# Patient Record
Sex: MALE | Race: WHITE | NOT HISPANIC OR LATINO | Employment: FULL TIME | URBAN - METROPOLITAN AREA
[De-identification: names, ages, dates, MRNs, and addresses within clinical notes are randomized per-mention and may not be internally consistent; named-entity substitution may affect disease eponyms.]

---

## 2020-09-09 PROCEDURE — 99284 EMERGENCY DEPT VISIT MOD MDM: CPT

## 2020-09-10 ENCOUNTER — HOSPITAL ENCOUNTER (EMERGENCY)
Facility: MEDICAL CENTER | Age: 35
End: 2020-09-10
Attending: EMERGENCY MEDICINE
Payer: COMMERCIAL

## 2020-09-10 ENCOUNTER — APPOINTMENT (OUTPATIENT)
Dept: RADIOLOGY | Facility: MEDICAL CENTER | Age: 35
End: 2020-09-10
Attending: EMERGENCY MEDICINE
Payer: COMMERCIAL

## 2020-09-10 VITALS
RESPIRATION RATE: 17 BRPM | SYSTOLIC BLOOD PRESSURE: 147 MMHG | HEIGHT: 67 IN | DIASTOLIC BLOOD PRESSURE: 90 MMHG | HEART RATE: 80 BPM | WEIGHT: 127.21 LBS | TEMPERATURE: 97.7 F | OXYGEN SATURATION: 99 % | BODY MASS INDEX: 19.97 KG/M2

## 2020-09-10 DIAGNOSIS — F41.9 ANXIETY: ICD-10-CM

## 2020-09-10 DIAGNOSIS — R07.89 CHEST WALL PAIN: ICD-10-CM

## 2020-09-10 LAB
ALBUMIN SERPL BCP-MCNC: 4.6 G/DL (ref 3.2–4.9)
ALBUMIN/GLOB SERPL: 1.6 G/DL
ALP SERPL-CCNC: 121 U/L (ref 30–99)
ALT SERPL-CCNC: 119 U/L (ref 2–50)
ANION GAP SERPL CALC-SCNC: 18 MMOL/L (ref 7–16)
AST SERPL-CCNC: 138 U/L (ref 12–45)
BASOPHILS # BLD AUTO: 0.6 % (ref 0–1.8)
BASOPHILS # BLD: 0.04 K/UL (ref 0–0.12)
BILIRUB SERPL-MCNC: 1.2 MG/DL (ref 0.1–1.5)
BUN SERPL-MCNC: 8 MG/DL (ref 8–22)
CALCIUM SERPL-MCNC: 10.1 MG/DL (ref 8.5–10.5)
CHLORIDE SERPL-SCNC: 96 MMOL/L (ref 96–112)
CO2 SERPL-SCNC: 24 MMOL/L (ref 20–33)
CREAT SERPL-MCNC: 0.6 MG/DL (ref 0.5–1.4)
EOSINOPHIL # BLD AUTO: 0.05 K/UL (ref 0–0.51)
EOSINOPHIL NFR BLD: 0.8 % (ref 0–6.9)
ERYTHROCYTE [DISTWIDTH] IN BLOOD BY AUTOMATED COUNT: 42.8 FL (ref 35.9–50)
GLOBULIN SER CALC-MCNC: 2.9 G/DL (ref 1.9–3.5)
GLUCOSE SERPL-MCNC: 93 MG/DL (ref 65–99)
HCT VFR BLD AUTO: 48.5 % (ref 42–52)
HGB BLD-MCNC: 17.3 G/DL (ref 14–18)
IMM GRANULOCYTES # BLD AUTO: 0.01 K/UL (ref 0–0.11)
IMM GRANULOCYTES NFR BLD AUTO: 0.2 % (ref 0–0.9)
LYMPHOCYTES # BLD AUTO: 1.66 K/UL (ref 1–4.8)
LYMPHOCYTES NFR BLD: 26.4 % (ref 22–41)
MCH RBC QN AUTO: 34.8 PG (ref 27–33)
MCHC RBC AUTO-ENTMCNC: 35.7 G/DL (ref 33.7–35.3)
MCV RBC AUTO: 97.6 FL (ref 81.4–97.8)
MONOCYTES # BLD AUTO: 0.57 K/UL (ref 0–0.85)
MONOCYTES NFR BLD AUTO: 9.1 % (ref 0–13.4)
NEUTROPHILS # BLD AUTO: 3.95 K/UL (ref 1.82–7.42)
NEUTROPHILS NFR BLD: 62.9 % (ref 44–72)
NRBC # BLD AUTO: 0 K/UL
NRBC BLD-RTO: 0 /100 WBC
PLATELET # BLD AUTO: 301 K/UL (ref 164–446)
PMV BLD AUTO: 10.1 FL (ref 9–12.9)
POTASSIUM SERPL-SCNC: 3.2 MMOL/L (ref 3.6–5.5)
PROT SERPL-MCNC: 7.5 G/DL (ref 6–8.2)
RBC # BLD AUTO: 4.97 M/UL (ref 4.7–6.1)
SODIUM SERPL-SCNC: 138 MMOL/L (ref 135–145)
TROPONIN T SERPL-MCNC: <6 NG/L (ref 6–19)
WBC # BLD AUTO: 6.3 K/UL (ref 4.8–10.8)

## 2020-09-10 PROCEDURE — 84484 ASSAY OF TROPONIN QUANT: CPT

## 2020-09-10 PROCEDURE — 93005 ELECTROCARDIOGRAM TRACING: CPT | Performed by: EMERGENCY MEDICINE

## 2020-09-10 PROCEDURE — 71045 X-RAY EXAM CHEST 1 VIEW: CPT

## 2020-09-10 PROCEDURE — 99283 EMERGENCY DEPT VISIT LOW MDM: CPT

## 2020-09-10 PROCEDURE — 85025 COMPLETE CBC W/AUTO DIFF WBC: CPT

## 2020-09-10 PROCEDURE — 80053 COMPREHEN METABOLIC PANEL: CPT

## 2020-09-10 PROCEDURE — 36415 COLL VENOUS BLD VENIPUNCTURE: CPT

## 2020-09-10 RX ORDER — IBUPROFEN 800 MG/1
800 TABLET ORAL EVERY 8 HOURS PRN
Qty: 30 TAB | Refills: 0 | Status: SHIPPED | OUTPATIENT
Start: 2020-09-10

## 2020-09-10 SDOH — HEALTH STABILITY: MENTAL HEALTH: HOW MANY STANDARD DRINKS CONTAINING ALCOHOL DO YOU HAVE ON A TYPICAL DAY?: 5 OR 6

## 2020-09-10 SDOH — HEALTH STABILITY: MENTAL HEALTH: HOW OFTEN DO YOU HAVE 6 OR MORE DRINKS ON ONE OCCASION?: DAILY OR ALMOST DAILY

## 2020-09-10 SDOH — HEALTH STABILITY: MENTAL HEALTH: HOW OFTEN DO YOU HAVE A DRINK CONTAINING ALCOHOL?: 4 OR MORE TIMES A WEEK

## 2020-09-10 NOTE — ED TRIAGE NOTES
"Curt Doyle   34 y.o. male   Chief Complaint   Patient presents with   • Chest Pain     off and on for a few days, history of panic attacks but they are not usually linked to chest pain. substernal that does not radiate, reports cold clammy skin and feeling flushed as well. heavy ETOH per pt. one beer today      Pt amb to triage with steady gait for above complaint.      Pt is alert and oriented, speaking in full sentences, follows commands and responds appropriately to questions. NAD. Resp are even and unlabored.   Pt placed in lobby. Pt educated on triage process. Pt encouraged to alert staff for any changes.    /88   Pulse 95   Temp 36.5 °C (97.7 °F) (Temporal)   Resp 19   Ht 1.702 m (5' 7\")   Wt 57.7 kg (127 lb 3.3 oz)   SpO2 98%   BMI 19.92 kg/m²     "

## 2020-09-10 NOTE — ED NOTES
Discharge paperwork reviewed and signed with patient. No questions at this time. PIV removed by RN. Patient ambulated out of ED with a steady gait.

## 2020-09-10 NOTE — ED PROVIDER NOTES
ED Provider Note    CHIEF COMPLAINT  Chief Complaint   Patient presents with   • Chest Pain     off and on for a few days, history of panic attacks but they are not usually linked to chest pain. substernal that does not radiate, reports cold clammy skin and feeling flushed as well. heavy ETOH per pt. one beer today       HPI  Curt Kwon is a 34 y.o. male here for evaluation of chest pain.  Patient states he has had intermittent substernal chest pain for approximately 4 to 5 days, and he states that it is staying in the anterior chest.  He has no radiation of pain to the back, or neck.  However he states that over the last day he has had some bilateral upper extremity tingling, and fast breathing, but does not feel short of breath.  He has no nausea or vomiting.  Nothing seems alleviate or exacerbate his symptoms.  He did take 2 Deann aspirin prior to coming in without any relief of his pain.  He has no abdominal pain, headache, or leg pain.      ROS  See HPI for further details, o/w negative.     PAST MEDICAL HISTORY       SOCIAL HISTORY  Social History     Tobacco Use   • Smoking status: Never Smoker   • Smokeless tobacco: Current User     Types: Chew   Substance and Sexual Activity   • Alcohol use: Yes     Frequency: 4 or more times a week     Drinks per session: 5 or 6     Binge frequency: Daily or almost daily   • Drug use: Never   • Sexual activity: Not on file       Family History  No bleeding disorders    SURGICAL HISTORY  patient denies any surgical history    CURRENT MEDICATIONS  Home Medications     Reviewed by Roger Vasquez R.N. (Registered Nurse) on 09/10/20 at 0026  Med List Status: <None>   Medication Last Dose Status        Patient Marito Taking any Medications                       ALLERGIES  No Known Allergies    REVIEW OF SYSTEMS  See HPI for further details. Review of systems as above, otherwise all other systems are negative.     PHYSICAL EXAM  Constitutional: Well developed, well  nourished.  Mild acute distress.  HEENT: Normocephalic, atraumatic. Posterior pharynx clear and moist.  Eyes:  EOMI. Normal sclera.  Neck: Supple, Full range of motion, nontender.  Chest/Pulmonary: clear to ausculation. Symmetrical expansion.   Cardio: Regular rate and rhythm with no murmur.   Abdomen: Soft, nontender. No peritoneal signs. No guarding. No palpable masses.  Musculoskeletal: No deformity, no edema, neurovascular intact.   Neuro: Clear speech, appropriate, cooperative, cranial nerves II-XII grossly intact.  Psych: Anxious mood and affect      Results for orders placed or performed during the hospital encounter of 09/10/20   CBC with Differential   Result Value Ref Range    WBC 6.3 4.8 - 10.8 K/uL    RBC 4.97 4.70 - 6.10 M/uL    Hemoglobin 17.3 14.0 - 18.0 g/dL    Hematocrit 48.5 42.0 - 52.0 %    MCV 97.6 81.4 - 97.8 fL    MCH 34.8 (H) 27.0 - 33.0 pg    MCHC 35.7 (H) 33.7 - 35.3 g/dL    RDW 42.8 35.9 - 50.0 fL    Platelet Count 301 164 - 446 K/uL    MPV 10.1 9.0 - 12.9 fL    Neutrophils-Polys 62.90 44.00 - 72.00 %    Lymphocytes 26.40 22.00 - 41.00 %    Monocytes 9.10 0.00 - 13.40 %    Eosinophils 0.80 0.00 - 6.90 %    Basophils 0.60 0.00 - 1.80 %    Immature Granulocytes 0.20 0.00 - 0.90 %    Nucleated RBC 0.00 /100 WBC    Neutrophils (Absolute) 3.95 1.82 - 7.42 K/uL    Lymphs (Absolute) 1.66 1.00 - 4.80 K/uL    Monos (Absolute) 0.57 0.00 - 0.85 K/uL    Eos (Absolute) 0.05 0.00 - 0.51 K/uL    Baso (Absolute) 0.04 0.00 - 0.12 K/uL    Immature Granulocytes (abs) 0.01 0.00 - 0.11 K/uL    NRBC (Absolute) 0.00 K/uL   Complete Metabolic Panel (CMP)   Result Value Ref Range    Sodium 138 135 - 145 mmol/L    Potassium 3.2 (L) 3.6 - 5.5 mmol/L    Chloride 96 96 - 112 mmol/L    Co2 24 20 - 33 mmol/L    Anion Gap 18.0 (H) 7.0 - 16.0    Glucose 93 65 - 99 mg/dL    Bun 8 8 - 22 mg/dL    Creatinine 0.60 0.50 - 1.40 mg/dL    Calcium 10.1 8.5 - 10.5 mg/dL    AST(SGOT) 138 (H) 12 - 45 U/L    ALT(SGPT) 119 (H) 2 - 50  "U/L    Alkaline Phosphatase 121 (H) 30 - 99 U/L    Total Bilirubin 1.2 0.1 - 1.5 mg/dL    Albumin 4.6 3.2 - 4.9 g/dL    Total Protein 7.5 6.0 - 8.2 g/dL    Globulin 2.9 1.9 - 3.5 g/dL    A-G Ratio 1.6 g/dL   Troponin   Result Value Ref Range    Troponin T <6 6 - 19 ng/L   ESTIMATED GFR   Result Value Ref Range    GFR If African American >60 >60 mL/min/1.73 m 2    GFR If Non African American >60 >60 mL/min/1.73 m 2     DX-CHEST-PORTABLE (1 VIEW)   Final Result         1.  No acute cardiopulmonary disease.        Ekg;  nsr 77, no ST elevation, no ST depression.  QTC is 444, no comparison    PROCEDURES     MEDICAL RECORD  I have reviewed patient's medical record and pertinent results are listed.    COURSE & MEDICAL DECISION MAKING  I have reviewed any medical record information, laboratory studies and radiographic results as noted above.    1:49 AM  She has a negative troponin, unremarkable EKG, negative chest x-ray.  He has had symptoms for around 4 days, and at this point if it were cardiac, he would have a positive troponin.  He also has history of panic and anxiety, and his chest pain associated with mild paresthesias of bilateral hands could be a result.  Patient is comfortable, afebrile, and well-appearing.  He has no shortness of breath, no recent travel, and heart rate around 80.  I am not suspecting PE at this time.  Patient states he drinks \"a lot of alcohol.\"  He drinks 3-8 beers a day.  His liver enzymes are elevated, and he will need to follow-up.  He agrees to do so in the next 2 to 3 days.    If you have had any blood pressure issues while here in the emergency department, please see your doctor for a further evaluation or work up.    Differential diagnoses include but not limited to: MI, PE, pneumonia, anxiety    This patient presents with pain and anxiety.  At this time, I have counseled the patient/family regarding their medications, pain control, and follow up.  They will continue their medications, " if any, as prescribed.  They will return immediately for any worsening symptoms and/or any other medical concerns.  They will see their doctor, or contact the doctor provided, in 1-2 days for follow up.       FINAL IMPRESSION  Chest pain  Anxiety  Elevated liver enzymes      Electronically signed by: Abdifatah Donato D.O., 9/10/2020 1:14 AM

## 2024-06-18 ENCOUNTER — HOSPITAL ENCOUNTER (OUTPATIENT)
Dept: CARDIOLOGY | Facility: MEDICAL CENTER | Age: 39
End: 2024-06-18
Payer: COMMERCIAL

## 2024-06-18 LAB — EKG IMPRESSION: NORMAL

## 2024-06-18 PROCEDURE — 93010 ELECTROCARDIOGRAM REPORT: CPT | Performed by: INTERNAL MEDICINE

## 2024-06-18 PROCEDURE — 93005 ELECTROCARDIOGRAM TRACING: CPT

## 2024-07-01 ENCOUNTER — APPOINTMENT (OUTPATIENT)
Dept: RADIOLOGY | Facility: IMAGING CENTER | Age: 39
End: 2024-07-01
Payer: COMMERCIAL

## 2024-07-01 ENCOUNTER — OFFICE VISIT (OUTPATIENT)
Dept: URGENT CARE | Facility: CLINIC | Age: 39
End: 2024-07-01
Payer: COMMERCIAL

## 2024-07-01 VITALS
WEIGHT: 142 LBS | BODY MASS INDEX: 22.82 KG/M2 | SYSTOLIC BLOOD PRESSURE: 130 MMHG | HEART RATE: 97 BPM | DIASTOLIC BLOOD PRESSURE: 84 MMHG | OXYGEN SATURATION: 94 % | HEIGHT: 66 IN | RESPIRATION RATE: 16 BRPM | TEMPERATURE: 97.6 F

## 2024-07-01 DIAGNOSIS — M79.672 LEFT FOOT PAIN: ICD-10-CM

## 2024-07-01 PROCEDURE — 73630 X-RAY EXAM OF FOOT: CPT | Mod: TC,LT

## 2024-07-01 PROCEDURE — 3075F SYST BP GE 130 - 139MM HG: CPT

## 2024-07-01 PROCEDURE — 3079F DIAST BP 80-89 MM HG: CPT

## 2024-07-01 PROCEDURE — 99203 OFFICE O/P NEW LOW 30 MIN: CPT

## 2024-07-01 RX ORDER — ALPRAZOLAM 0.5 MG/1
0.5 TABLET ORAL
COMMUNITY
Start: 2024-06-19

## 2024-07-01 RX ORDER — ATOMOXETINE 60 MG/1
60 CAPSULE ORAL
COMMUNITY
Start: 2024-06-11

## 2024-07-01 RX ORDER — ESCITALOPRAM OXALATE 20 MG/1
20 TABLET ORAL
COMMUNITY
Start: 2024-06-11

## 2024-07-22 ENCOUNTER — OFFICE VISIT (OUTPATIENT)
Dept: SPORTS MEDICINE | Facility: OTHER | Age: 39
End: 2024-07-22
Payer: COMMERCIAL

## 2024-07-22 VITALS
HEART RATE: 99 BPM | OXYGEN SATURATION: 95 % | WEIGHT: 145 LBS | DIASTOLIC BLOOD PRESSURE: 92 MMHG | BODY MASS INDEX: 23.3 KG/M2 | HEIGHT: 66 IN | TEMPERATURE: 97.6 F | SYSTOLIC BLOOD PRESSURE: 138 MMHG

## 2024-07-22 DIAGNOSIS — M79.672 CHRONIC FOOT PAIN, LEFT: ICD-10-CM

## 2024-07-22 DIAGNOSIS — G89.29 CHRONIC FOOT PAIN, LEFT: ICD-10-CM

## 2024-07-22 PROCEDURE — 99214 OFFICE O/P EST MOD 30 MIN: CPT | Performed by: FAMILY MEDICINE

## 2024-07-22 PROCEDURE — 3075F SYST BP GE 130 - 139MM HG: CPT | Performed by: FAMILY MEDICINE

## 2024-07-22 PROCEDURE — 3080F DIAST BP >= 90 MM HG: CPT | Performed by: FAMILY MEDICINE

## 2024-07-22 ASSESSMENT — ENCOUNTER SYMPTOMS
CHILLS: 0
SHORTNESS OF BREATH: 0
DIZZINESS: 0
FEVER: 0
NAUSEA: 0
VOMITING: 0

## 2024-08-12 ENCOUNTER — OFFICE VISIT (OUTPATIENT)
Dept: SPORTS MEDICINE | Facility: OTHER | Age: 39
End: 2024-08-12
Payer: COMMERCIAL

## 2024-08-12 VITALS
OXYGEN SATURATION: 97 % | HEIGHT: 66 IN | WEIGHT: 145 LBS | HEART RATE: 100 BPM | BODY MASS INDEX: 23.3 KG/M2 | SYSTOLIC BLOOD PRESSURE: 122 MMHG | RESPIRATION RATE: 16 BRPM | DIASTOLIC BLOOD PRESSURE: 76 MMHG | TEMPERATURE: 97.8 F

## 2024-08-12 DIAGNOSIS — M79.672 CHRONIC FOOT PAIN, LEFT: ICD-10-CM

## 2024-08-12 DIAGNOSIS — G89.29 CHRONIC FOOT PAIN, LEFT: ICD-10-CM

## 2024-08-12 PROCEDURE — 3078F DIAST BP <80 MM HG: CPT | Performed by: FAMILY MEDICINE

## 2024-08-12 PROCEDURE — 99213 OFFICE O/P EST LOW 20 MIN: CPT | Performed by: FAMILY MEDICINE

## 2024-08-12 PROCEDURE — 3074F SYST BP LT 130 MM HG: CPT | Performed by: FAMILY MEDICINE

## 2024-08-12 ASSESSMENT — ENCOUNTER SYMPTOMS
VOMITING: 0
CHILLS: 0
NAUSEA: 0
DIZZINESS: 0
SHORTNESS OF BREATH: 0
FEVER: 0

## 2024-08-12 NOTE — PROGRESS NOTES
Chief Complaint   Patient presents with    Foot Pain     L foot pain      Subjective     Referred by RADHA Booth  for evaluation of LEFT ankle pain  2+ months  Achy and sharp  Medial ankle  Worsening  Improved with rest and minimal improvement with ankle compression sleeve  Worse with excessive walking  Worse after work shift when he sits a while  Has tried Tylenol which does relieve some of his symptoms  Denies any night symptoms related to the LEFT ankle pain, but since he has been walking with an altered gait he has noticed pain in the RIGHT leg and some of that pain is shifted up to the RIGHT side of his trunk as well  He denies any remote injuries or issues with the LEFT lower extremity    Update:  LEFT foot pain PERSISTS    Works as a  at [employer], on his feet for several hours per shift  Activities include taking his kids to the park and mostly recovering/relaxing    Review of Systems   Constitutional:  Negative for chills and fever.   Respiratory:  Negative for shortness of breath.    Cardiovascular:  Negative for chest pain.   Gastrointestinal:  Negative for nausea and vomiting.   Neurological:  Negative for dizziness.     PMH:  has no past medical history on file.  MEDS:   Current Outpatient Medications:     ALPRAZolam (XANAX) 0.5 MG Tab, Take 0.5 mg by mouth every 24 hours as needed., Disp: , Rfl:     atomoxetine (STRATTERA) 60 MG capsule, Take 60 mg by mouth every day., Disp: , Rfl:     escitalopram (LEXAPRO) 20 MG tablet, Take 20 mg by mouth every day., Disp: , Rfl:     diclofenac sodium (VOLTAREN) 1 % Gel, Apply 1 g topically 4 times a day as needed (foot pain)., Disp: 150 g, Rfl: 0    ibuprofen (MOTRIN) 800 MG Tab, Take 1 Tab by mouth every 8 hours as needed., Disp: 30 Tab, Rfl: 0  ALLERGIES: No Known Allergies  SURGHX: No past surgical history on file.  SOCHX:  reports that he has never smoked. His smokeless tobacco use includes chew. He reports current alcohol use. He reports that he  "does not use drugs.  FH: Family history was reviewed, no pertinent findings to report    Objective   /76 (BP Location: Left arm, Patient Position: Sitting, BP Cuff Size: Adult)   Pulse 100   Temp 36.6 °C (97.8 °F) (Temporal)   Resp 16   Ht 1.664 m (5' 5.5\")   Wt 65.8 kg (145 lb)   SpO2 97%   BMI 23.76 kg/m²   \  LEFT ANKLE:  There is NO swelling noted at the ankle  Range of motion intact with dorsiflexion and plantarflexion, inversion and eversion  There is NO tenderness of the ATFL, CF or PTF ligament  There is NO tenderness of the lateral malleolus or medial malleolus  Anterior drawer testing is NEGATIVE  Talar tilt testing is NEGATIVE  The foot and ankle is otherwise neurovascularly intact    LEFT FOOT:  There is NO swelling noted at the foot  CONTINUED POSITIVE tenderness along the cuboid, and minimal to no tenderness along the tarsal navicular  There is NO pain with metatarsal squeeze test    NEUTRAL stance  Able to ambulate with Antalgic gait    1. Chronic foot pain, left  MR-FOOT-W/O LEFT    MR-FOOT-W/O LEFT        2+ months  Achy and sharp  Medial ankle  Worsening    X-rays were \"normal\", but I do feel there is some unexplained haziness in the region of the cuboid at the area of his tenderness  Question the possibility of tarsal coalition    He is NOT respond to home exercise program and immobilization   Check MRI of the foot for assessment of possible tarsal coalition versus stress fracture of the cuboid    Initial provider directed care for this issue was on July 1, 2024 at the urgent care  FAILED conservative care including immobilization, rest and home exercise program    No follow-ups on file.  3 wks or after MRI to discuss results and further management options        7/1/2024 3:41 PM     HISTORY/REASON FOR EXAM:  Atraumatic Pain/Swelling/Deformity; lateral foot        TECHNIQUE/EXAM DESCRIPTION AND NUMBER OF VIEWS:  3 views of the LEFT foot.     COMPARISON:  None.     FINDINGS:  No acute " fracture is noted. There is no dislocation.  No bone erosion is noted.     IMPRESSION:     No acute fracture or dislocation is noted.           Exam Ended: 07/01/24  3:54 PM Last Resulted: 07/01/24  4:01 PM        Thank you RADHA Booth for allowing me to participate in care of your patient.      ADDENDUM:   Temporary handicap placard form completed

## 2024-09-07 ENCOUNTER — HOSPITAL ENCOUNTER (OUTPATIENT)
Dept: RADIOLOGY | Facility: MEDICAL CENTER | Age: 39
End: 2024-09-07
Attending: FAMILY MEDICINE
Payer: COMMERCIAL

## 2024-09-07 DIAGNOSIS — G89.29 CHRONIC FOOT PAIN, LEFT: ICD-10-CM

## 2024-09-07 DIAGNOSIS — M79.672 CHRONIC FOOT PAIN, LEFT: ICD-10-CM

## 2024-09-07 PROCEDURE — 73718 MRI LOWER EXTREMITY W/O DYE: CPT | Mod: LT

## 2024-09-24 ENCOUNTER — OFFICE VISIT (OUTPATIENT)
Dept: SPORTS MEDICINE | Facility: OTHER | Age: 39
End: 2024-09-24
Payer: COMMERCIAL

## 2024-09-24 VITALS — HEIGHT: 66 IN | BODY MASS INDEX: 23.3 KG/M2 | WEIGHT: 145 LBS

## 2024-09-24 DIAGNOSIS — G89.29 CHRONIC FOOT PAIN, LEFT: ICD-10-CM

## 2024-09-24 DIAGNOSIS — M79.672 CHRONIC FOOT PAIN, LEFT: ICD-10-CM

## 2024-09-24 PROCEDURE — 99213 OFFICE O/P EST LOW 20 MIN: CPT | Performed by: FAMILY MEDICINE

## 2024-09-24 NOTE — PROGRESS NOTES
"Chief Complaint   Patient presents with    Foot Pain     MRI results      Subjective     Referred by RADHA Booth  for evaluation of LEFT ankle pain  Since roughly early May 2024  Achy and sharp  Medial ankle  Worsening  Improved with rest and minimal improvement with ankle compression sleeve  Worse with excessive walking  Worse after work shift when he sits a while  Has tried Tylenol which does relieve some of his symptoms  Denies any night symptoms related to the LEFT ankle pain, but since he has been walking with an altered gait he has noticed pain in the RIGHT leg and some of that pain is shifted up to the RIGHT side of his trunk as well  He denies any remote injuries or issues with the LEFT lower extremity    Update:  LEFT foot pain PERSISTS  Here to discuss MRI    Laid off as a  at [facility]  Activities include taking his kids to the park and mostly recovering/relaxing    Objective   Ht 1.664 m (5' 5.5\")   Wt 65.8 kg (145 lb)   BMI 23.76 kg/m²     LEFT ANKLE:  There is NO swelling noted at the ankle  Range of motion intact with dorsiflexion and plantarflexion, inversion and eversion  There is NO tenderness of the ATFL, CF or PTF ligament  There is NO tenderness of the lateral malleolus or medial malleolus  Anterior drawer testing is NEGATIVE  Talar tilt testing is NEGATIVE  The foot and ankle is otherwise neurovascularly intact    LEFT FOOT:  There is NO swelling noted at the foot  CONTINUED POSITIVE tenderness along the cuboid, and minimal to no tenderness along the tarsal navicular  There is NO pain with metatarsal squeeze test    NEUTRAL stance  Able to ambulate with Antalgic gait    1. Chronic foot pain, left          Since roughly early May 2024  Achy and sharp  Medial ankle  Worsening    X-rays were \"normal\", but I do feel there is some unexplained haziness in the region of the cuboid at the area of his tenderness  Question the possibility of tarsal coalition    MRI of the foot:    1. Mild " edema involving the second metatarsal head, concerning for stress injury.  2. Mild edema in the cuboid and anterior calcaneus could be stress related injury too.    Most of his tenderness continues to be at the cuboid    Recommend 4 weeks of nonweightbearing  Recommend turning leg   Stable injury, rest should heal his issue    He is currently applying for unemployment since he was let go from his position    Return in about 3 weeks (around 10/15/2024).  To see how he is doing after 3 weeks of nonweightbearing at that point                         Narrative & Impression     9/7/2024 2:58 PM     HISTORY/REASON FOR EXAM:  Persistent LEFT cuboid pain/tenderness  Forefoot pain     TECHNIQUE/EXAM DESCRIPTION:  MRI of the LEFT foot without contrast.     The study was performed on a [employer] 1.5 Ethel MRI scanner. T1 axial and sagittal, fast spin-echo T2 fat-suppressed axial and coronal, and fast inversion recovery sagittal images were obtained.     COMPARISON:  None.     FINDINGS:     Bone: There is edema involving the second metatarsal head. Mild edema in the cuboid and anterior calcaneus.     Joints:  - Metatarsophalangeal (MTP): There are no erosions. There are no effusions or synovitis.  - Proximal interphalangeal (PIP): There are no erosions. There are no effusions or synovitis.  - Distal interphalangeal (DIP): There are no erosions. There are no effusions or synovitis.     Tendons:  - Extensors: There are no tears, tendinopathy, or tenosynovitis.  - Flexors: There are no tears, tendinopathy, or tenosynovitis.     Intermetatarsal spaces:  - Salgado neuroma: None.  - 1st intermetatarsal bursa: No bursal distension.  - 2nd intermetatarsal bursa: No bursal distension.  - 3rd intermetatarsal bursa: No bursal distension.  - 4th intermetatarsal bursa: No bursal distension.     IMPRESSION:        1. Mild edema involving the second metatarsal head, concerning for stress injury.  2. Mild edema in the cuboid and anterior  calcaneus could be stress related injury too.           Exam Ended: 09/07/24  3:27 PM Last Resulted: 09/08/24  3:03 AM              7/1/2024 3:41 PM     HISTORY/REASON FOR EXAM:  Atraumatic Pain/Swelling/Deformity; lateral foot        TECHNIQUE/EXAM DESCRIPTION AND NUMBER OF VIEWS:  3 views of the LEFT foot.     COMPARISON:  None.     FINDINGS:  No acute fracture is noted. There is no dislocation.  No bone erosion is noted.     IMPRESSION:     No acute fracture or dislocation is noted.           Exam Ended: 07/01/24  3:54 PM Last Resulted: 07/01/24  4:01 PM